# Patient Record
Sex: MALE | Race: WHITE | HISPANIC OR LATINO | Employment: FULL TIME | URBAN - METROPOLITAN AREA
[De-identification: names, ages, dates, MRNs, and addresses within clinical notes are randomized per-mention and may not be internally consistent; named-entity substitution may affect disease eponyms.]

---

## 2022-08-01 ENCOUNTER — OFFICE VISIT (OUTPATIENT)
Dept: INTERNAL MEDICINE | Facility: CLINIC | Age: 27
End: 2022-08-01
Payer: COMMERCIAL

## 2022-08-01 VITALS
TEMPERATURE: 97.7 F | HEIGHT: 69 IN | DIASTOLIC BLOOD PRESSURE: 93 MMHG | SYSTOLIC BLOOD PRESSURE: 145 MMHG | WEIGHT: 218.6 LBS | RESPIRATION RATE: 18 BRPM | BODY MASS INDEX: 32.38 KG/M2 | OXYGEN SATURATION: 99 % | HEART RATE: 83 BPM

## 2022-08-01 DIAGNOSIS — Z13.220 LIPID SCREENING: ICD-10-CM

## 2022-08-01 DIAGNOSIS — J45.990 EXERCISE-INDUCED ASTHMA: ICD-10-CM

## 2022-08-01 DIAGNOSIS — R06.83 SNORING: ICD-10-CM

## 2022-08-01 DIAGNOSIS — Z00.00 ANNUAL PHYSICAL EXAM: Primary | ICD-10-CM

## 2022-08-01 DIAGNOSIS — R03.0 ELEVATED BP WITHOUT DIAGNOSIS OF HYPERTENSION: ICD-10-CM

## 2022-08-01 PROCEDURE — 99385 PREV VISIT NEW AGE 18-39: CPT | Performed by: INTERNAL MEDICINE

## 2022-08-01 RX ORDER — ALBUTEROL SULFATE 90 UG/1
2 AEROSOL, METERED RESPIRATORY (INHALATION) EVERY 6 HOURS
Qty: 18 G | Refills: 4 | Status: SHIPPED | OUTPATIENT
Start: 2022-08-01 | End: 2022-11-28

## 2022-08-01 ASSESSMENT — ENCOUNTER SYMPTOMS
PALPITATIONS: 0
CONSTIPATION: 0
WEAKNESS: 0
DYSURIA: 0
NERVOUS/ANXIOUS: 0
HEMATURIA: 0
FREQUENCY: 0
HEMATOCHEZIA: 0
PARESTHESIAS: 0
CHILLS: 0
ABDOMINAL PAIN: 0
EYE PAIN: 0
DIZZINESS: 0
HEARTBURN: 0
SORE THROAT: 0
ARTHRALGIAS: 0
DIARRHEA: 0
NAUSEA: 0
SHORTNESS OF BREATH: 0
JOINT SWELLING: 0
HEADACHES: 0
MYALGIAS: 0
COUGH: 0
FEVER: 0

## 2022-08-01 NOTE — PATIENT INSTRUCTIONS
Goal b/p <130/80, your b/p is elevated today, try to loose 10 lbs . Check b/p at home, get a bicept cuff. Let me know if b/p 150 or higher.     2. Schedule fasting lab work    3. Sleep apnea can cause elevated b/p and anxiety. See sleep clinic.

## 2022-08-01 NOTE — PROGRESS NOTES
SUBJECTIVE:   CC: Reid Silva is an 27 year old male who presents for preventative health visit.     Reid is a 27-year-old male with history of exercise-induced asthma, intermittent sciatica due to previous disc bulge.  He is currently here for a physical.    He is requesting refill for albuterol.  He has no history of smoking.  Currently he uses albuterol only before working out.    Denies depression and anxiety.  He does have family history of bipolar depression in his dad.    Blood pressure today is elevated.  Recent venous visits at doctors offices blood pressure has also been between 130 and 140.  He does have hypertension in his bed he has a history of stroke at the age of 41.  He denies eating salty snacks, he drinks 2-3 alcoholic beverages a week.  No coffee or energy drinks, no drugs.  His BMI is 32.  He does have significant snoring and dad also has history of sleep apnea.      Patient has been advised of split billing requirements and indicates understanding: Yes  Healthy Habits:   PHQ-2 Total Score: 0    Today's PHQ-2 Score:   PHQ-2 ( 1999 [employer]) 8/1/2022   Q1: Little interest or pleasure in doing things 0   Q2: Feeling down, depressed or hopeless 0   PHQ-2 Score 0   Q1: Little interest or pleasure in doing things Not at all   Q2: Feeling down, depressed or hopeless Not at all   PHQ-2 Score 0       Abuse: Current or Past(Physical, Sexual or Emotional)- No  Do you feel safe in your environment? Yes    Have you ever done Advance Care Planning? (For example, a Health Directive, POLST, or a discussion with a medical provider or your loved ones about your wishes): No, advance care planning information given to patient to review.  Patient declined advance care planning discussion at this time.    Social History     Tobacco Use     Smoking status: Never Smoker     Smokeless tobacco: Never Used   Substance Use Topics     Alcohol use: Yes     Alcohol/week: 5.0 standard drinks     Types: 1 Glasses of  "wine, 1 Cans of beer, 1 Shots of liquor, 2 Standard drinks or equivalent per week     Comment: social drinker       Alcohol Use 8/1/2022   Prescreen: >3 drinks/day or >7 drinks/week? No     Last PSA: No results found for: PSA    Reviewed orders with patient. Reviewed health maintenance and updated orders accordingly - Yes      Reviewed and updated as needed this visit by clinical staff   Tobacco   Meds      Soc Hx          Reviewed and updated as needed this visit by Provider                       Review of Systems  As above, no shortness of breath, chest pains or palpitations.  No abdominal pain constipation or diarrhea.  No urinary problems    Family history: Dad has high blood pressure, sleep apnea, valve replacement, stroke in his 40s, bipolar disorder, Guillain-Barré.  Mom has history of breast cancer.  His brother and sister, sister has asthma.    Social history he is a  for grades K-5.  No history of smoking or drug use.  Minimal alcohol use    OBJECTIVE:   BP (!) 145/93 (BP Location: Left arm, Patient Position: Sitting, Cuff Size: Adult Large)   Pulse 83   Temp 97.7  F (36.5  C) (Tympanic)   Resp 18   Ht 1.74 m (5' 8.5\")   Wt 99.2 kg (218 lb 9.6 oz)   SpO2 99%   BMI 32.75 kg/m      Physical Exam  General: well appearing male, alert and oriented x3  EYES: Eyelids, conjunctiva, and sclera were normal. Pupils were normal.   HEAD, EARS, NOSE, MOUTH, AND THROAT: no cervical LAD, no thyromegaly or nodules appreciated. TMs are visualized and normal, oropharynx is clear, slightly crowded.  RESPIRATORY: respirations non labored, CTA bl, no wheezes, rales, no forced expiratory wheezing.  CARDIOVASCULAR: Heart rate and rhythm were normal. No murmurs, rubs,gallops. There was no peripheral edema.   GASTROINTESTINAL: Positive bowel sounds, abdomen is soft, non tender, non distended.     MUSCULOSKELETAL: Muscle mass was normal for age. No joint synovitis or deformity.  LYMPHATIC: There were no " enlarged nodes palpable.  SKIN/HAIR/NAILS: Skin color was normal.  No rashes.  Several tattoos noted.  NEUROLOGIC: The patient was alert and oriented.  Speech was normal.  There is no facial asymmetry.   PSYCHIATRIC:  Mood is mildly anxious and affect were normal.         ASSESSMENT/PLAN:   Reid was seen today for physical and recheck medication.    Diagnoses and all orders for this visit:    Annual physical exam  He will come back for fasting blood work.  He declined HIV and hepatitis C screen today  -     albuterol (PROAIR HFA/PROVENTIL HFA/VENTOLIN HFA) 108 (90 Base) MCG/ACT inhaler; Inhale 2 puffs into the lungs every 6 hours  -     CBC with platelets and differential; Future  -     Comprehensive metabolic panel; Future  -     Lipid panel reflex to direct LDL Fasting; Future  -     TSH with free T4 reflex; Future    Exercise-induced asthma  No persistent symptoms  -     albuterol (PROAIR HFA/PROVENTIL HFA/VENTOLIN HFA) 108 (90 Base) MCG/ACT inhaler; Inhale 2 puffs into the lungs every 6 hours    Elevated BP without diagnosis of hypertension  She does have family history of hypertension and sleep apnea.  His risk factors would be increased BMI and snoring.  Discussed weight loss and following up at the sleep clinic for apnea testing.  We will follow-up in 4 months.  We will consider checking renal artery ultrasound given his young age and starting him on anti-hypertensive medication at that time, he wanted to avoid starting on medication today.  -     CBC with platelets and differential; Future  -     Comprehensive metabolic panel; Future  -     Lipid panel reflex to direct LDL Fasting; Future  -     TSH with free T4 reflex; Future  -     Adult Sleep Eval & Management  Referral; Future    Lipid screening  -     Lipid panel reflex to direct LDL Fasting; Future    Snoring  -     Adult Sleep Eval & Management  Referral; Future      Estimated body mass index is 32.75 kg/m  as calculated from the  "following:    Height as of this encounter: 1.74 m (5' 8.5\").    Weight as of this encounter: 99.2 kg (218 lb 9.6 oz).    He reports that he has never smoked. He has never used smokeless tobacco.      Cara Garcia MD  [facility]  "

## 2022-08-04 ENCOUNTER — DOCUMENTATION ONLY (OUTPATIENT)
Dept: LAB | Facility: CLINIC | Age: 27
End: 2022-08-04

## 2022-08-04 NOTE — PROGRESS NOTES
Reid Silva has an upcoming lab appointment:    Future Appointments   Date Time Provider Department Center   8/5/2022  7:45 AM [facility]     There is no order available. Please review and place either future orders or HMPO (Review of Health Maintenance Protocol Orders), as appropriate.    Health Maintenance Due   Topic     ANNUAL REVIEW OF HM ORDERS      HIV SCREENING      HEPATITIS C SCREENING      Reid Chung

## 2022-11-27 DIAGNOSIS — J45.990 EXERCISE-INDUCED ASTHMA: ICD-10-CM

## 2022-11-27 DIAGNOSIS — Z00.00 ANNUAL PHYSICAL EXAM: ICD-10-CM

## 2022-11-28 RX ORDER — ALBUTEROL SULFATE 90 UG/1
2 AEROSOL, METERED RESPIRATORY (INHALATION) EVERY 6 HOURS
Qty: 8.5 G | Refills: 2 | Status: SHIPPED | OUTPATIENT
Start: 2022-11-28 | End: 2023-02-06

## 2022-11-28 NOTE — TELEPHONE ENCOUNTER
"Routing refill request to provider for review/approval because:  act    Last Written Prescription Date:  8/1/22  Last Fill Quantity: 18,  # refills: 4   Last office visit provider:  8/1/22     Requested Prescriptions   Pending Prescriptions Disp Refills     albuterol (PROAIR HFA/PROVENTIL HFA/VENTOLIN HFA) 108 (90 Base) MCG/ACT inhaler [Pharmacy Med Name: ALBUTEROL HFA INH (200 PUFFS)8.5GM] 8.5 g      Sig: INHALE 2 PUFFS INTO THE LUNGS EVERY 6 HOURS       Asthma Maintenance Inhalers - Anticholinergics Failed - 11/27/2022  1:13 PM        Failed - Asthma control assessment score within normal limits in last 6 months     Please review ACT score.           Passed - Patient is age 12 years or older        Passed - Medication is active on med list        Passed - Recent (6 mo) or future (30 days) visit within the authorizing provider's specialty     Patient had office visit in the last 6 months or has a visit in the next 30 days with authorizing provider or within the authorizing provider's specialty.  See \"Patient Info\" tab in inbasket, or \"Choose Columns\" in Meds & Orders section of the refill encounter.           Short-Acting Beta Agonist Inhalers Protocol  Failed - 11/27/2022  1:13 PM        Failed - Asthma control assessment score within normal limits in last 6 months     Please review ACT score.           Passed - Patient is age 12 or older        Passed - Medication is active on med list        Passed - Recent (6 mo) or future (30 days) visit within the authorizing provider's specialty     Patient had office visit in the last 6 months or has a visit in the next 30 days with authorizing provider or within the authorizing provider's specialty.  See \"Patient Info\" tab in inbasket, or \"Choose Columns\" in Meds & Orders section of the refill encounter.                 Dina Beaver RN 11/28/22 10:55 AM  "

## 2023-02-05 DIAGNOSIS — J45.990 EXERCISE-INDUCED ASTHMA: ICD-10-CM

## 2023-02-05 DIAGNOSIS — Z00.00 ANNUAL PHYSICAL EXAM: ICD-10-CM

## 2023-02-06 RX ORDER — ALBUTEROL SULFATE 90 UG/1
AEROSOL, METERED RESPIRATORY (INHALATION)
Qty: 8.5 G | Refills: 2 | Status: SHIPPED | OUTPATIENT
Start: 2023-02-06 | End: 2023-04-26

## 2023-02-06 NOTE — TELEPHONE ENCOUNTER
"Routing refill request to provider for review/approval because:  act    Last Written Prescription Date:  11/28/22  Last Fill Quantity: 8.5,  # refills: 2   Last office visit provider:  8/1/22     Requested Prescriptions   Pending Prescriptions Disp Refills     albuterol (PROAIR HFA/PROVENTIL HFA/VENTOLIN HFA) 108 (90 Base) MCG/ACT inhaler [Pharmacy Med Name: ALBUTEROL HFA INH (200 PUFFS)8.5GM] 8.5 g 2     Sig: INHALE 2 PUFFS BY MOUTH INTO THE LUNGS EVERY 6 HOURS       Asthma Maintenance Inhalers - Anticholinergics Failed - 2/5/2023  1:07 PM        Failed - Asthma control assessment score within normal limits in last 6 months     Please review ACT score.           Failed - Recent (6 mo) or future (30 days) visit within the authorizing provider's specialty     Patient had office visit in the last 6 months or has a visit in the next 30 days with authorizing provider or within the authorizing provider's specialty.  See \"Patient Info\" tab in inbasket, or \"Choose Columns\" in Meds & Orders section of the refill encounter.            Passed - Patient is age 12 years or older        Passed - Medication is active on med list       Short-Acting Beta Agonist Inhalers Protocol  Failed - 2/5/2023  1:07 PM        Failed - Asthma control assessment score within normal limits in last 6 months     Please review ACT score.           Failed - Recent (6 mo) or future (30 days) visit within the authorizing provider's specialty     Patient had office visit in the last 6 months or has a visit in the next 30 days with authorizing provider or within the authorizing provider's specialty.  See \"Patient Info\" tab in inbasket, or \"Choose Columns\" in Meds & Orders section of the refill encounter.            Passed - Patient is age 12 or older        Passed - Medication is active on med list             Dina Beaver RN 02/06/23 3:33 PM  "

## 2023-04-24 DIAGNOSIS — Z00.00 ANNUAL PHYSICAL EXAM: ICD-10-CM

## 2023-04-24 DIAGNOSIS — J45.990 EXERCISE-INDUCED ASTHMA: ICD-10-CM

## 2023-04-26 RX ORDER — ALBUTEROL SULFATE 90 UG/1
AEROSOL, METERED RESPIRATORY (INHALATION)
Qty: 8.5 G | Refills: 2 | Status: SHIPPED | OUTPATIENT
Start: 2023-04-26 | End: 2023-07-01

## 2023-04-26 NOTE — TELEPHONE ENCOUNTER
"Routing refill request to provider for review/approval because:  Patient need to be seen because it has been more than 6 months since last office visit.  ACT Score  PCP not listed    Last Written Prescription Date:  2/6/2023  Last Fill Quantity: 8.5g,  # refills: 2   Last office visit provider:  8/1/2022     Requested Prescriptions   Pending Prescriptions Disp Refills     albuterol (PROAIR HFA/PROVENTIL HFA/VENTOLIN HFA) 108 (90 Base) MCG/ACT inhaler [Pharmacy Med Name: ALBUTEROL HFA INH (200 PUFFS) 8.5GM] 8.5 g 2     Sig: INHALE 2 PUFFS BY MOUTH EVERY 6 HOURS       Asthma Maintenance Inhalers - Anticholinergics Failed - 4/24/2023 12:26 PM        Failed - Asthma control assessment score within normal limits in last 6 months     Please review ACT score.           Failed - Recent (6 mo) or future (30 days) visit within the authorizing provider's specialty     Patient had office visit in the last 6 months or has a visit in the next 30 days with authorizing provider or within the authorizing provider's specialty.  See \"Patient Info\" tab in inbasket, or \"Choose Columns\" in Meds & Orders section of the refill encounter.            Passed - Patient is age 12 years or older        Passed - Medication is active on med list       Short-Acting Beta Agonist Inhalers Protocol  Failed - 4/24/2023 12:26 PM        Failed - Asthma control assessment score within normal limits in last 6 months     Please review ACT score.           Failed - Recent (6 mo) or future (30 days) visit within the authorizing provider's specialty     Patient had office visit in the last 6 months or has a visit in the next 30 days with authorizing provider or within the authorizing provider's specialty.  See \"Patient Info\" tab in inbasket, or \"Choose Columns\" in Meds & Orders section of the refill encounter.            Passed - Patient is age 12 or older        Passed - Medication is active on med list             Kim Pandey RN 04/25/23 11:25 PM  "

## 2023-04-26 NOTE — TELEPHONE ENCOUNTER
Refill signed.  Patient need to schedule follow-up in the office to see how his blood pressure is.  If he has been checking blood pressures at home, please bring records.

## 2023-07-01 DIAGNOSIS — J45.990 EXERCISE-INDUCED ASTHMA: ICD-10-CM

## 2023-07-01 DIAGNOSIS — Z00.00 ANNUAL PHYSICAL EXAM: ICD-10-CM

## 2023-07-01 RX ORDER — ALBUTEROL SULFATE 90 UG/1
AEROSOL, METERED RESPIRATORY (INHALATION)
Qty: 8.5 G | Refills: 2 | Status: SHIPPED | OUTPATIENT
Start: 2023-07-01 | End: 2023-09-21

## 2023-07-02 NOTE — TELEPHONE ENCOUNTER
"Routing refill request to provider for review/approval because:  Patient needs to be seen because:  It's been over six months since last visit  ACT not on file    Last Written Prescription Date: 4-26-23  Last Fill Quantity:8.5 g,  # refills: 2   Last office visit provider: 8-1-22     Requested Prescriptions   Pending Prescriptions Disp Refills     albuterol (PROAIR HFA/PROVENTIL HFA/VENTOLIN HFA) 108 (90 Base) MCG/ACT inhaler [Pharmacy Med Name: ALBUTEROL HFA INH (200 PUFFS) 8.5GM] 8.5 g 2     Sig: INHALE 2 PUFFS BY MOUTH EVERY 6 HOURS       Asthma Maintenance Inhalers - Anticholinergics Failed - 7/1/2023 10:37 AM        Failed - Asthma control assessment score within normal limits in last 6 months     Please review ACT score.           Failed - Recent (6 mo) or future (30 days) visit within the authorizing provider's specialty     Patient had office visit in the last 6 months or has a visit in the next 30 days with authorizing provider or within the authorizing provider's specialty.  See \"Patient Info\" tab in inbasket, or \"Choose Columns\" in Meds & Orders section of the refill encounter.            Passed - Patient is age 12 years or older        Passed - Medication is active on med list       Short-Acting Beta Agonist Inhalers Protocol  Failed - 7/1/2023 10:37 AM        Failed - Asthma control assessment score within normal limits in last 6 months     Please review ACT score.           Failed - Recent (6 mo) or future (30 days) visit within the authorizing provider's specialty     Patient had office visit in the last 6 months or has a visit in the next 30 days with authorizing provider or within the authorizing provider's specialty.  See \"Patient Info\" tab in inbasket, or \"Choose Columns\" in Meds & Orders section of the refill encounter.            Passed - Patient is age 12 or older        Passed - Medication is active on med list             Natalie Boles RN 07/01/23 7:25 PM  "

## 2023-09-21 DIAGNOSIS — J45.990 EXERCISE-INDUCED ASTHMA: ICD-10-CM

## 2023-09-21 DIAGNOSIS — Z00.00 ANNUAL PHYSICAL EXAM: ICD-10-CM

## 2023-09-21 RX ORDER — ALBUTEROL SULFATE 90 UG/1
AEROSOL, METERED RESPIRATORY (INHALATION)
Qty: 8.5 G | Refills: 2 | Status: SHIPPED | OUTPATIENT
Start: 2023-09-21 | End: 2023-12-04

## 2023-12-02 DIAGNOSIS — J45.990 EXERCISE-INDUCED ASTHMA: ICD-10-CM

## 2023-12-02 DIAGNOSIS — Z00.00 ANNUAL PHYSICAL EXAM: ICD-10-CM

## 2023-12-04 RX ORDER — ALBUTEROL SULFATE 90 UG/1
AEROSOL, METERED RESPIRATORY (INHALATION)
Qty: 8.5 G | Refills: 2 | Status: SHIPPED | OUTPATIENT
Start: 2023-12-04 | End: 2024-02-12

## 2024-02-12 DIAGNOSIS — Z00.00 ANNUAL PHYSICAL EXAM: ICD-10-CM

## 2024-02-12 DIAGNOSIS — J45.990 EXERCISE-INDUCED ASTHMA: ICD-10-CM

## 2024-02-12 RX ORDER — ALBUTEROL SULFATE 90 UG/1
AEROSOL, METERED RESPIRATORY (INHALATION)
Qty: 8.5 G | Refills: 2 | Status: SHIPPED | OUTPATIENT
Start: 2024-02-12 | End: 2024-07-12

## 2024-07-12 DIAGNOSIS — Z00.00 ANNUAL PHYSICAL EXAM: ICD-10-CM

## 2024-07-12 DIAGNOSIS — J45.990 EXERCISE-INDUCED ASTHMA: ICD-10-CM

## 2024-07-12 RX ORDER — ALBUTEROL SULFATE 90 UG/1
AEROSOL, METERED RESPIRATORY (INHALATION)
Qty: 8.5 G | Refills: 3 | Status: SHIPPED | OUTPATIENT
Start: 2024-07-12

## 2024-10-10 DIAGNOSIS — Z00.00 ANNUAL PHYSICAL EXAM: ICD-10-CM

## 2024-10-10 DIAGNOSIS — J45.990 EXERCISE-INDUCED ASTHMA: ICD-10-CM

## 2024-10-10 RX ORDER — ALBUTEROL SULFATE 90 UG/1
INHALANT RESPIRATORY (INHALATION)
Qty: 8.5 G | Refills: 3 | Status: SHIPPED | OUTPATIENT
Start: 2024-10-10

## 2024-10-10 NOTE — TELEPHONE ENCOUNTER
Phone # not in service, no myc, sent out letter of rx refill and to call and schedule appt for any further refills.

## 2025-04-23 DIAGNOSIS — J45.990 EXERCISE-INDUCED ASTHMA: ICD-10-CM

## 2025-04-23 DIAGNOSIS — Z00.00 ANNUAL PHYSICAL EXAM: ICD-10-CM

## 2025-04-23 RX ORDER — ALBUTEROL SULFATE 90 UG/1
INHALANT RESPIRATORY (INHALATION)
Qty: 8.5 G | Refills: 3 | Status: SHIPPED | OUTPATIENT
Start: 2025-04-23